# Patient Record
Sex: FEMALE | Race: WHITE | NOT HISPANIC OR LATINO
[De-identification: names, ages, dates, MRNs, and addresses within clinical notes are randomized per-mention and may not be internally consistent; named-entity substitution may affect disease eponyms.]

---

## 2017-03-30 ENCOUNTER — RECORDS - HEALTHEAST (OUTPATIENT)
Dept: ADMINISTRATIVE | Facility: OTHER | Age: 37
End: 2017-03-30

## 2017-03-30 LAB
HPV_EXT - HISTORICAL: NORMAL
PAP SMEAR - HIM PATIENT REPORTED: NORMAL

## 2017-05-10 ENCOUNTER — OFFICE VISIT - HEALTHEAST (OUTPATIENT)
Dept: FAMILY MEDICINE | Facility: CLINIC | Age: 37
End: 2017-05-10

## 2017-05-10 DIAGNOSIS — Z13.220 SCREENING FOR LIPID DISORDERS: ICD-10-CM

## 2017-05-10 DIAGNOSIS — Z13.1 SCREENING FOR DIABETES MELLITUS: ICD-10-CM

## 2017-05-10 DIAGNOSIS — Z00.00 ROUTINE GENERAL MEDICAL EXAMINATION AT A HEALTH CARE FACILITY: ICD-10-CM

## 2017-05-10 LAB
CHOLEST SERPL-MCNC: 184 MG/DL
FASTING STATUS PATIENT QL REPORTED: ABNORMAL
HDLC SERPL-MCNC: 42 MG/DL
LDLC SERPL CALC-MCNC: 128 MG/DL
TRIGL SERPL-MCNC: 70 MG/DL

## 2017-05-10 ASSESSMENT — MIFFLIN-ST. JEOR: SCORE: 1454.95

## 2017-05-11 ENCOUNTER — COMMUNICATION - HEALTHEAST (OUTPATIENT)
Dept: FAMILY MEDICINE | Facility: CLINIC | Age: 37
End: 2017-05-11

## 2017-05-11 ENCOUNTER — AMBULATORY - HEALTHEAST (OUTPATIENT)
Dept: FAMILY MEDICINE | Facility: CLINIC | Age: 37
End: 2017-05-11

## 2017-05-11 DIAGNOSIS — E16.2 LOW BLOOD GLUCOSE MEASUREMENT: ICD-10-CM

## 2017-05-18 ENCOUNTER — AMBULATORY - HEALTHEAST (OUTPATIENT)
Dept: LAB | Facility: CLINIC | Age: 37
End: 2017-05-18

## 2017-05-18 DIAGNOSIS — E16.2 LOW BLOOD GLUCOSE MEASUREMENT: ICD-10-CM

## 2017-05-24 ENCOUNTER — COMMUNICATION - HEALTHEAST (OUTPATIENT)
Dept: FAMILY MEDICINE | Facility: CLINIC | Age: 37
End: 2017-05-24

## 2017-11-01 ENCOUNTER — AMBULATORY - HEALTHEAST (OUTPATIENT)
Dept: NURSING | Facility: CLINIC | Age: 37
End: 2017-11-01

## 2017-11-01 DIAGNOSIS — Z23 NEED FOR IMMUNIZATION AGAINST INFLUENZA: ICD-10-CM

## 2018-04-21 ENCOUNTER — RECORDS - HEALTHEAST (OUTPATIENT)
Dept: ADMINISTRATIVE | Facility: OTHER | Age: 38
End: 2018-04-21

## 2018-04-25 ENCOUNTER — OFFICE VISIT - HEALTHEAST (OUTPATIENT)
Dept: PHARMACY | Facility: CLINIC | Age: 38
End: 2018-04-25

## 2018-04-25 ENCOUNTER — OFFICE VISIT - HEALTHEAST (OUTPATIENT)
Dept: FAMILY MEDICINE | Facility: CLINIC | Age: 38
End: 2018-04-25

## 2018-04-25 DIAGNOSIS — D68.51 FACTOR V LEIDEN (H): ICD-10-CM

## 2018-04-25 DIAGNOSIS — Z01.818 ENCOUNTER FOR PREOPERATIVE EXAMINATION FOR GENERAL SURGICAL PROCEDURE: ICD-10-CM

## 2018-04-25 DIAGNOSIS — Z51.81 ANTICOAGULATION MANAGEMENT ENCOUNTER: ICD-10-CM

## 2018-04-25 DIAGNOSIS — Z79.01 ANTICOAGULATION MANAGEMENT ENCOUNTER: ICD-10-CM

## 2018-04-25 LAB
ERYTHROCYTE [DISTWIDTH] IN BLOOD BY AUTOMATED COUNT: 11.1 % (ref 11–14.5)
HCT VFR BLD AUTO: 40.8 % (ref 35–47)
HGB BLD-MCNC: 13.9 G/DL (ref 12–16)
MCH RBC QN AUTO: 31.4 PG (ref 27–34)
MCHC RBC AUTO-ENTMCNC: 34 G/DL (ref 32–36)
MCV RBC AUTO: 92 FL (ref 80–100)
PLATELET # BLD AUTO: 238 THOU/UL (ref 140–440)
PMV BLD AUTO: 7.3 FL (ref 7–10)
RBC # BLD AUTO: 4.41 MILL/UL (ref 3.8–5.4)
WBC: 7.4 THOU/UL (ref 4–11)

## 2018-04-25 ASSESSMENT — MIFFLIN-ST. JEOR: SCORE: 1490.96

## 2018-05-07 ENCOUNTER — RECORDS - HEALTHEAST (OUTPATIENT)
Dept: ADMINISTRATIVE | Facility: OTHER | Age: 38
End: 2018-05-07

## 2018-05-14 ENCOUNTER — OFFICE VISIT - HEALTHEAST (OUTPATIENT)
Dept: FAMILY MEDICINE | Facility: CLINIC | Age: 38
End: 2018-05-14

## 2018-05-14 DIAGNOSIS — Z00.00 ENCOUNTER FOR ANNUAL PHYSICAL EXAM: ICD-10-CM

## 2018-05-14 DIAGNOSIS — Z83.49 FAMILY HISTORY OF HYPOTHYROIDISM: ICD-10-CM

## 2018-05-14 LAB
ALBUMIN SERPL-MCNC: 4 G/DL (ref 3.5–5)
ALP SERPL-CCNC: 63 U/L (ref 45–120)
ALT SERPL W P-5'-P-CCNC: 15 U/L (ref 0–45)
ANION GAP SERPL CALCULATED.3IONS-SCNC: 10 MMOL/L (ref 5–18)
AST SERPL W P-5'-P-CCNC: 16 U/L (ref 0–40)
BILIRUB SERPL-MCNC: 0.7 MG/DL (ref 0–1)
BUN SERPL-MCNC: 12 MG/DL (ref 8–22)
CALCIUM SERPL-MCNC: 9.9 MG/DL (ref 8.5–10.5)
CHLORIDE BLD-SCNC: 104 MMOL/L (ref 98–107)
CHOLEST SERPL-MCNC: 247 MG/DL
CO2 SERPL-SCNC: 26 MMOL/L (ref 22–31)
CREAT SERPL-MCNC: 0.65 MG/DL (ref 0.6–1.1)
FASTING STATUS PATIENT QL REPORTED: YES
FASTING STATUS PATIENT QL REPORTED: YES
GFR SERPL CREATININE-BSD FRML MDRD: >60 ML/MIN/1.73M2
GLUCOSE BLD-MCNC: 85 MG/DL (ref 70–99)
GLUCOSE BLD-MCNC: 86 MG/DL (ref 70–125)
HDLC SERPL-MCNC: 40 MG/DL
LDLC SERPL CALC-MCNC: 181 MG/DL
POTASSIUM BLD-SCNC: 4.3 MMOL/L (ref 3.5–5)
PROT SERPL-MCNC: 7.5 G/DL (ref 6–8)
SODIUM SERPL-SCNC: 140 MMOL/L (ref 136–145)
TRIGL SERPL-MCNC: 132 MG/DL
TSH SERPL DL<=0.005 MIU/L-ACNC: 1.96 UIU/ML (ref 0.3–5)

## 2018-05-14 ASSESSMENT — MIFFLIN-ST. JEOR: SCORE: 1472.82

## 2018-05-16 ENCOUNTER — COMMUNICATION - HEALTHEAST (OUTPATIENT)
Dept: FAMILY MEDICINE | Facility: CLINIC | Age: 38
End: 2018-05-16

## 2018-05-21 ENCOUNTER — RECORDS - HEALTHEAST (OUTPATIENT)
Dept: ADMINISTRATIVE | Facility: OTHER | Age: 38
End: 2018-05-21

## 2018-05-22 ENCOUNTER — COMMUNICATION - HEALTHEAST (OUTPATIENT)
Dept: FAMILY MEDICINE | Facility: CLINIC | Age: 38
End: 2018-05-22

## 2018-05-23 ENCOUNTER — COMMUNICATION - HEALTHEAST (OUTPATIENT)
Dept: FAMILY MEDICINE | Facility: CLINIC | Age: 38
End: 2018-05-23

## 2018-06-04 ENCOUNTER — RECORDS - HEALTHEAST (OUTPATIENT)
Dept: ADMINISTRATIVE | Facility: OTHER | Age: 38
End: 2018-06-04

## 2018-06-14 ENCOUNTER — RECORDS - HEALTHEAST (OUTPATIENT)
Dept: ADMINISTRATIVE | Facility: OTHER | Age: 38
End: 2018-06-14

## 2018-06-20 ENCOUNTER — COMMUNICATION - HEALTHEAST (OUTPATIENT)
Dept: FAMILY MEDICINE | Facility: CLINIC | Age: 38
End: 2018-06-20

## 2018-07-16 ENCOUNTER — RECORDS - HEALTHEAST (OUTPATIENT)
Dept: ADMINISTRATIVE | Facility: OTHER | Age: 38
End: 2018-07-16

## 2018-10-15 ENCOUNTER — RECORDS - HEALTHEAST (OUTPATIENT)
Dept: ADMINISTRATIVE | Facility: OTHER | Age: 38
End: 2018-10-15

## 2018-10-30 ENCOUNTER — AMBULATORY - HEALTHEAST (OUTPATIENT)
Dept: NURSING | Facility: CLINIC | Age: 38
End: 2018-10-30

## 2019-05-30 ENCOUNTER — OFFICE VISIT - HEALTHEAST (OUTPATIENT)
Dept: FAMILY MEDICINE | Facility: CLINIC | Age: 39
End: 2019-05-30

## 2019-05-30 DIAGNOSIS — Z00.00 ROUTINE GENERAL MEDICAL EXAMINATION AT A HEALTH CARE FACILITY: ICD-10-CM

## 2019-05-30 DIAGNOSIS — Z23 IMMUNIZATION DUE: ICD-10-CM

## 2019-05-30 DIAGNOSIS — Z13.220 SCREENING FOR LIPID DISORDERS: ICD-10-CM

## 2019-05-30 LAB
CHOLEST SERPL-MCNC: 205 MG/DL
FASTING STATUS PATIENT QL REPORTED: NO
HDLC SERPL-MCNC: 45 MG/DL
LDLC SERPL CALC-MCNC: 135 MG/DL
TRIGL SERPL-MCNC: 127 MG/DL

## 2019-05-30 ASSESSMENT — MIFFLIN-ST. JEOR: SCORE: 1461.48

## 2019-06-10 ENCOUNTER — RECORDS - HEALTHEAST (OUTPATIENT)
Dept: MAMMOGRAPHY | Facility: CLINIC | Age: 39
End: 2019-06-10

## 2019-06-10 DIAGNOSIS — Z12.31 ENCOUNTER FOR SCREENING MAMMOGRAM FOR MALIGNANT NEOPLASM OF BREAST: ICD-10-CM

## 2019-06-19 ENCOUNTER — HOSPITAL ENCOUNTER (OUTPATIENT)
Dept: MAMMOGRAPHY | Facility: CLINIC | Age: 39
Discharge: HOME | End: 2019-06-19
Attending: FAMILY MEDICINE

## 2019-06-19 DIAGNOSIS — N64.89 BREAST ASYMMETRY: ICD-10-CM

## 2019-08-05 ENCOUNTER — HOSPITAL ENCOUNTER (OUTPATIENT)
Dept: MAMMOGRAPHY | Facility: CLINIC | Age: 39
Discharge: HOME | End: 2019-08-05
Attending: FAMILY MEDICINE

## 2019-08-05 ENCOUNTER — HOSPITAL ENCOUNTER (OUTPATIENT)
Dept: MAMMOGRAPHY | Facility: CLINIC | Age: 39
Discharge: HOME | End: 2019-08-05
Attending: FAMILY MEDICINE | Admitting: RADIOLOGY

## 2019-08-05 DIAGNOSIS — R92.0 BREAST MICROCALCIFICATIONS: ICD-10-CM

## 2019-08-06 ENCOUNTER — COMMUNICATION - HEALTHEAST (OUTPATIENT)
Dept: MAMMOGRAPHY | Facility: CLINIC | Age: 39
End: 2019-08-06

## 2019-08-06 LAB
LAB AP CHARGES (HE HISTORICAL CONVERSION): NORMAL
PATH REPORT.COMMENTS IMP SPEC: NORMAL
PATH REPORT.COMMENTS IMP SPEC: NORMAL
PATH REPORT.FINAL DX SPEC: NORMAL
PATH REPORT.GROSS SPEC: NORMAL
PATH REPORT.MICROSCOPIC SPEC OTHER STN: NORMAL
PATH REPORT.RELEVANT HX SPEC: NORMAL
RESULT FLAG (HE HISTORICAL CONVERSION): NORMAL

## 2019-08-16 ENCOUNTER — RECORDS - HEALTHEAST (OUTPATIENT)
Dept: HEALTH INFORMATION MANAGEMENT | Facility: CLINIC | Age: 39
End: 2019-08-16

## 2019-11-05 ENCOUNTER — AMBULATORY - HEALTHEAST (OUTPATIENT)
Dept: NURSING | Facility: CLINIC | Age: 39
End: 2019-11-05

## 2019-11-05 DIAGNOSIS — Z23 IMMUNIZATION DUE: ICD-10-CM

## 2020-07-17 ENCOUNTER — COMMUNICATION - HEALTHEAST (OUTPATIENT)
Dept: SCHEDULING | Facility: CLINIC | Age: 40
End: 2020-07-17

## 2020-07-17 ENCOUNTER — RECORDS - HEALTHEAST (OUTPATIENT)
Dept: ADMINISTRATIVE | Facility: OTHER | Age: 40
End: 2020-07-17

## 2020-07-17 ENCOUNTER — OFFICE VISIT - HEALTHEAST (OUTPATIENT)
Dept: FAMILY MEDICINE | Facility: CLINIC | Age: 40
End: 2020-07-17

## 2020-07-17 DIAGNOSIS — N61.0 MASTITIS: ICD-10-CM

## 2020-07-28 ENCOUNTER — OFFICE VISIT - HEALTHEAST (OUTPATIENT)
Dept: FAMILY MEDICINE | Facility: CLINIC | Age: 40
End: 2020-07-28

## 2020-07-28 DIAGNOSIS — Z00.00 ROUTINE GENERAL MEDICAL EXAMINATION AT A HEALTH CARE FACILITY: ICD-10-CM

## 2020-07-28 DIAGNOSIS — Z13.220 SCREENING FOR LIPID DISORDERS: ICD-10-CM

## 2020-07-28 DIAGNOSIS — Z13.1 SCREENING FOR DIABETES MELLITUS: ICD-10-CM

## 2020-07-28 LAB
CHOLEST SERPL-MCNC: 182 MG/DL
FASTING STATUS PATIENT QL REPORTED: YES
FASTING STATUS PATIENT QL REPORTED: YES
GLUCOSE BLD-MCNC: 75 MG/DL (ref 70–99)
HDLC SERPL-MCNC: 38 MG/DL
LDLC SERPL CALC-MCNC: 115 MG/DL
TRIGL SERPL-MCNC: 143 MG/DL

## 2020-07-28 ASSESSMENT — MIFFLIN-ST. JEOR: SCORE: 1479.75

## 2020-07-30 ENCOUNTER — RECORDS - HEALTHEAST (OUTPATIENT)
Dept: ADMINISTRATIVE | Facility: OTHER | Age: 40
End: 2020-07-30

## 2020-07-30 ENCOUNTER — RECORDS - HEALTHEAST (OUTPATIENT)
Dept: RADIOLOGY | Facility: CLINIC | Age: 40
End: 2020-07-30

## 2020-07-30 ENCOUNTER — HOSPITAL ENCOUNTER (OUTPATIENT)
Dept: MAMMOGRAPHY | Facility: CLINIC | Age: 40
Discharge: HOME | End: 2020-07-30

## 2020-07-30 DIAGNOSIS — N61.0 MASTITIS: ICD-10-CM

## 2020-07-31 ENCOUNTER — HOSPITAL ENCOUNTER (OUTPATIENT)
Dept: MAMMOGRAPHY | Facility: CLINIC | Age: 40
Discharge: HOME | End: 2020-07-31

## 2020-07-31 ENCOUNTER — COMMUNICATION - HEALTHEAST (OUTPATIENT)
Dept: FAMILY MEDICINE | Facility: CLINIC | Age: 40
End: 2020-07-31

## 2020-07-31 DIAGNOSIS — N61.0 MASTITIS: ICD-10-CM

## 2020-09-15 ENCOUNTER — COMMUNICATION - HEALTHEAST (OUTPATIENT)
Dept: FAMILY MEDICINE | Facility: CLINIC | Age: 40
End: 2020-09-15

## 2020-09-15 ENCOUNTER — COMMUNICATION - HEALTHEAST (OUTPATIENT)
Dept: SCHEDULING | Facility: CLINIC | Age: 40
End: 2020-09-15

## 2020-10-07 ENCOUNTER — AMBULATORY - HEALTHEAST (OUTPATIENT)
Dept: NURSING | Facility: CLINIC | Age: 40
End: 2020-10-07

## 2020-10-07 DIAGNOSIS — Z23 IMMUNIZATION DUE: ICD-10-CM

## 2021-05-27 VITALS
OXYGEN SATURATION: 99 % | TEMPERATURE: 98 F | HEART RATE: 98 BPM | SYSTOLIC BLOOD PRESSURE: 123 MMHG | DIASTOLIC BLOOD PRESSURE: 76 MMHG

## 2021-05-28 ENCOUNTER — OFFICE VISIT - HEALTHEAST (OUTPATIENT)
Dept: FAMILY MEDICINE | Facility: CLINIC | Age: 41
End: 2021-05-28

## 2021-05-28 DIAGNOSIS — Z00.00 ROUTINE GENERAL MEDICAL EXAMINATION AT A HEALTH CARE FACILITY: ICD-10-CM

## 2021-05-28 LAB
ANION GAP SERPL CALCULATED.3IONS-SCNC: 12 MMOL/L (ref 5–18)
BUN SERPL-MCNC: 13 MG/DL (ref 8–22)
CALCIUM SERPL-MCNC: 9.2 MG/DL (ref 8.5–10.5)
CHLORIDE BLD-SCNC: 105 MMOL/L (ref 98–107)
CHOLEST SERPL-MCNC: 194 MG/DL
CO2 SERPL-SCNC: 22 MMOL/L (ref 22–31)
CREAT SERPL-MCNC: 0.7 MG/DL (ref 0.6–1.1)
FASTING STATUS PATIENT QL REPORTED: YES
GFR SERPL CREATININE-BSD FRML MDRD: >60 ML/MIN/1.73M2
GLUCOSE BLD-MCNC: 79 MG/DL (ref 70–125)
HDLC SERPL-MCNC: 44 MG/DL
LDLC SERPL CALC-MCNC: 133 MG/DL
POTASSIUM BLD-SCNC: 4.1 MMOL/L (ref 3.5–5)
SODIUM SERPL-SCNC: 139 MMOL/L (ref 136–145)
TRIGL SERPL-MCNC: 84 MG/DL

## 2021-05-28 ASSESSMENT — MIFFLIN-ST. JEOR: SCORE: 1501.8

## 2021-05-29 NOTE — PROGRESS NOTES
Assessment:Plan       1. Routine general medical examination at a health care facility  ( Needs correct coding to get credit for annual exam through her insurance).  Chart sent for  review.    2. Screening for lipid disorders  - Lipid Profile    Subjective:      Ciera Pelayo is a 39 y.o. female who presents for an annual exam. The patient is sexually active. The patient participates in regular exercise: no. The patient reports that there is not domestic violence in her life.   See Gyn for PAP. Had IUD placed ~ 6 years ago. No menstrual periods on IUD.  No health concerns.   Pap in 2017 per pt.     Family history- + hyperlipidemia. Breast cancer + but 2nd degree relatives.     Healthy Habits:   Regular Exercise: No  Sunscreen Use: Yes  Healthy Diet: Yes  Dental Visits Regularly: Yes  Seat Belt: Yes  Sexually active: Yes  Self Breast Exam Monthly:No  Hemoccults: No  Flex Sig: No  Colonoscopy: No  Lipid Profile: Yes  Glucose Screen: Yes  Prevention of Osteoporosis: No  Last Dexa: No  Guns at Home:  Yes  Guns Safety Locks:  Yes      Immunization History   Administered Date(s) Administered     Influenza, inj, historic,unspecified 10/15/2008, 10/06/2009, 10/05/2010, 10/20/2011     Influenza, seasonal,quad inj 36+ mos 10/07/2015, 10/28/2016, 10/30/2018     Influenza, seasonal,quad inj 6-35 mos 10/06/2009, 10/16/2012     Influenza,seasonal quad, PF 11/07/2014     Influenza,seasonal quad, PF, 36+MOS 11/01/2017     Td,adult,historic,unspecified 07/30/2007     Tdap 07/30/2007     Immunization status: due today Tdap .     No exam data present    Gynecologic History  No LMP recorded. (Menstrual status: Contraception).  Contraception: IUD  Last Pap: unknown ANNE sign today to [facility]. Results were: N/A   Last mammogram: None, will schedule.       OB History   No data available       Current Outpatient Medications   Medication Sig Dispense Refill     acetaminophen (TYLENOL) 500 MG tablet Take 500 mg by mouth every 6 (six)  hours as needed for pain.       ELIQUIS 2.5 mg Tab tablet TAKE 1 TABLET BY MOUTH TWO TIMES DAILY 60 tablet 1     No current facility-administered medications for this visit.      No past medical history on file.  No past surgical history on file.  Patient has no known allergies.  No family history on file.  Social History     Socioeconomic History     Marital status:      Spouse name: Not on file     Number of children: Not on file     Years of education: Not on file     Highest education level: Not on file   Occupational History     Not on file   Social Needs     Financial resource strain: Not on file     Food insecurity:     Worry: Not on file     Inability: Not on file     Transportation needs:     Medical: Not on file     Non-medical: Not on file   Tobacco Use     Smoking status: Never Smoker     Smokeless tobacco: Never Used   Substance and Sexual Activity     Alcohol use: No     Drug use: No     Sexual activity: Yes     Partners: Male     Birth control/protection: IUD     Comment:    Lifestyle     Physical activity:     Days per week: Not on file     Minutes per session: Not on file     Stress: Not on file   Relationships     Social connections:     Talks on phone: Not on file     Gets together: Not on file     Attends L[employer] service: Not on file     Active member of club or organization: Not on file     Attends meetings of clubs or organizations: Not on file     Relationship status: Not on file     Intimate partner violence:     Fear of current or ex partner: Not on file     Emotionally abused: Not on file     Physically abused: Not on file     Forced sexual activity: Not on file   Other Topics Concern     Not on file   Social History Narrative     Not on file       Review of Systems  Review of Systems      No abnormal vaginal bleeding.   No acute fever or URI symptoms.       Objective:         Vitals:    05/30/19 1425   BP: 100/62   Pulse: 80   Resp: 16   Temp: 98.2  F (36.8  C)   TempSrc:  "Oral   Weight: 162 lb 8 oz (73.7 kg)   Height: 5' 9\" (1.753 m)     Body mass index is 24 kg/m .    Physical  Physical Exam     Gen - alert, orientated, NAD  Eyes - fundascopic exam limited by the undialated pupil but looks symmetric  ENT - oropharynx clear, TMs clear  Neck - supple, no palpable mass or lymphadenopathy  CV - RRR, no murmur  Breast - no dominant mass on either side, no axillary mass.  Resp - lungs CTA  Ab - soft, nontender, no palpable mass or organomegaly   - Deferred, no concerns.   Extrem - warm, no edema  Neuro - CN II-XII intact, strength, sensation, reflexes intact and symmetric  Skin - no rash.  No atypical appearing lesions seen.     "

## 2021-05-30 ENCOUNTER — RECORDS - HEALTHEAST (OUTPATIENT)
Dept: ADMINISTRATIVE | Facility: CLINIC | Age: 41
End: 2021-05-30

## 2021-05-30 VITALS — WEIGHT: 161.06 LBS | BODY MASS INDEX: 23.86 KG/M2 | HEIGHT: 69 IN

## 2021-05-31 ENCOUNTER — RECORDS - HEALTHEAST (OUTPATIENT)
Dept: ADMINISTRATIVE | Facility: CLINIC | Age: 41
End: 2021-05-31

## 2021-06-01 VITALS — HEIGHT: 69 IN | BODY MASS INDEX: 24.44 KG/M2 | WEIGHT: 165 LBS

## 2021-06-01 VITALS — WEIGHT: 169 LBS | HEIGHT: 69 IN | BODY MASS INDEX: 25.03 KG/M2

## 2021-06-03 VITALS — HEIGHT: 69 IN | BODY MASS INDEX: 24.07 KG/M2 | WEIGHT: 162.5 LBS

## 2021-06-04 VITALS
RESPIRATION RATE: 20 BRPM | DIASTOLIC BLOOD PRESSURE: 66 MMHG | HEIGHT: 69 IN | BODY MASS INDEX: 24.59 KG/M2 | HEART RATE: 72 BPM | TEMPERATURE: 97.8 F | WEIGHT: 166.06 LBS | SYSTOLIC BLOOD PRESSURE: 102 MMHG | OXYGEN SATURATION: 97 %

## 2021-06-09 NOTE — TELEPHONE ENCOUNTER
RN triage call from piedad  She noted Right breast pain onset 7/15/20  (not breast feeding)  She states she also felt in the area a hardened area  The next day it seemed lessened  Today feels better but now the area is red (about 4 fingers in size)  Also the area feels warm  Denies fever or chills but did feel body aches yesterday  Leaving for out of town tomorrow  Gave disposition for office visit today  Patient was agreeable. No in office visits left today patient will got to [facility] now  Tereza Hall, RN  [facility] Triage Nurse  12:33 PM  7/17/2020              Reason for Disposition    Breast looks infected (spreading redness, feels hot or painful to touch) and no fever    Additional Information    Negative: SEVERE breast pain and fever > 103 F (39.4 C)    Negative: Patient sounds very sick or weak to the triager    Protocols used: BREAST SYMPTOMS-A-[place]

## 2021-06-09 NOTE — PROGRESS NOTES
Chief Complaint   Patient presents with     Breast Pain     rt x 2 days, redness       HPI:  Ciera Pelayo is a 40 y.o. female who presents today with 2 days of erythema of right breast.  She states there is some mild tenderness and possibly a lump.  Her symptoms have worsened on the second day.  She denies any fevers, chills or fatigue.  She denies any nipple drainage.  She has nonlactating.  She denies any rash, malaise or myalgias.    Problem List:  Cellulitis  Factor V Leiden (H)  Pregnancy      No past medical history on file.    No current outpatient medications on file prior to visit.     No current facility-administered medications on file prior to visit.         Social History     Tobacco Use     Smoking status: Never Smoker     Smokeless tobacco: Never Used   Substance Use Topics     Alcohol use: No       ROS:  As stated in HPI    Vitals:    07/17/20 1332   BP: 123/76   Patient Site: Right Arm   Patient Position: Sitting   Cuff Size: Adult Regular   Pulse: 98   Temp: 98  F (36.7  C)   TempSrc: Oral   SpO2: 99%       Physical Exam:  General: Alert, No apparent distress, Cooperative  SKIN: dry, warm, no rash or lesions seen in areas of exposed skin  HENT: HEAD: ATNC,   EYES: Conjunctiva clear, EOMI  NECK: Supple, non tender, no cervical adenopathy  LUNGS: No respiratory distress,   Breast: Erythema over the inferolateral aspect of the right breast extending to the areaola.  Mild tenderness, no fluctuance but area is hardened.  NUERO: AOx3, normal mentation  PSYCH: normal mood and affect    Ultrasound breast: No abscess noted    Assessment and Plan:  1. Mastitis  US Breast Right Limited 1-3 Quadrants    US Breast Right Limited 1-3 Quadrants    CANCELED: US Breast Right Limited 1-3 Quadrants    CANCELED: US Biopsy Lymph Node Core (Breast) Right    CANCELED: US Breast Right Limited 1-3 Quadrants      Breast exam was done with a chaperone in the room    Ultrasound showed no abscess.  Exam is concerning for  mastitis and will treat empirically with Augmentin for infectious cause.   inflammatory/obstructive and infectious mastitis can improve over the course of a week or 2 and it is prudent to have a diagnostic bilateral mammogram in 2 to 3 weeks to make sure patient has no mass/malignancy or other cause of obstruction or inflammation.  Patient can start Augmentin today.  Discussed proper ministration side effects.  Ibuprofen for pain        Azeem Hurley PA-C

## 2021-06-10 NOTE — TELEPHONE ENCOUNTER
Ordering provider not working today in [facility] (Ranjeet Hurley) and the order went to his personal in-box. I spoke with the [facility] this afternoon and since primary MD has seen the patient 3 days ago and followed up the mastitis, the [facility] will contact her regarding getting ordered signed.

## 2021-06-10 NOTE — TELEPHONE ENCOUNTER
Who is calling:  Michelle  Reason for Call:  Order for right breast ultrasound needs to be signed.  Patient is due at 8:30 this morning.  Date of last appointment with primary care: Yesterday at [facility].   Okay to leave a detailed message: Yes

## 2021-06-10 NOTE — PROGRESS NOTES
Assessment:Plan   1. Routine general medical examination at a health care facility  Normal pap in 4/17.per pt. Done at Gyn office.    2. Screening for lipid disorders  - Lipid Cascade    3. Screening for diabetes mellitus    - Glucose      Subjective:      Ciera Pelayo is a 37 y.o. female who presents for an annual exam. The patient is sexually active. The patient participates in regular exercise: no. The patient reports that there is not domestic violence in her life.   No health concerns.    Healthy Habits:   Regular Exercise: No  Sunscreen Use: Yes  Healthy Diet: sometimes  Dental Visits Regularly: Yes  Seat Belt: Yes  Sexually active: Yes  Self Breast Exam Monthly:No  Hemoccults: N/A  Flex Sig: N/A  Colonoscopy: N/A  Lipid Profile: No  Glucose Screen: No  Prevention of Osteoporosis: N/A  Last Dexa: N/A        Immunization History   Administered Date(s) Administered     Influenza, inj, historic 10/15/2008, 10/06/2009, 10/05/2010, 10/20/2011     Influenza, seasonal,quad inj 36+ mos 10/07/2015, 10/28/2016     Influenza, seasonal,quad inj 6-35 mos 10/06/2009, 10/16/2012     Influenza,seasonal quad, PF 11/07/2014     Td, historic 07/30/2007     Tdap 07/30/2007     Immunization status: up to date and documented.    No exam data present    Gynecologic History  No LMP recorded (lmp unknown). Patient is not currently having periods (Reason: Contraception).  Contraception: IUD  Last Pap: Apirl 2017 done at [facility]. Results were: normal  Last mammogram: n/a. Results were: n/a      OB History   No data available       No current outpatient prescriptions on file.     No current facility-administered medications for this visit.      No past medical history on file.  No past surgical history on file.  Review of patient's allergies indicates no known allergies.  No family history on file.  Social History     Social History     Marital status:      Spouse name: N/A     Number of children: N/A     Years  "of education: N/A     Occupational History     Not on file.     Social History Main Topics     Smoking status: Never Smoker     Smokeless tobacco: Never Used     Alcohol use Yes     Drug use: Yes     Sexual activity: Yes     Partners: Male     Birth control/ protection: IUD      Comment:      Other Topics Concern     Not on file     Social History Narrative     No narrative on file       Review of Systems  General:  Denies problem  Eyes: Denies problem  Ears/Nose/Throat: Denies problem  Cardiovascular: Denies problem  Respiratory:  Denies problem  Gastrointestinal:  Denies problem, Genitourinary: Denies problem  Musculoskeletal:  Denies problem  Skin: Denies problem  Neurologic: Denies problem  Psychiatric: Denies problem  Endocrine: Denies problem  Heme/Lymphatic: Denies problem   Allergic/Immunologic: Denies problem        Objective:         Vitals:    05/10/17 1307   BP: 116/78   Pulse: 76   Resp: 16   Temp: 97.8  F (36.6  C)   TempSrc: Oral   Weight: 161 lb 1 oz (73.1 kg)   Height: 5' 9\" (1.753 m)     Body mass index is 23.78 kg/(m^2).    Physical Exam:  Gen - alert, orientated, NAD  Eyes - fundascopic exam limited by the undialated pupil but looks symmetric  ENT - oropharynx clear, TMs clear  Neck - supple, no palpable mass or lymphadenopathy  CV - RRR, no murmur  Breast - deferred, no concerns.  Resp - lungs CTA  Ab - soft, nontender, no palpable mass or organomegaly   - deferred.  Extrem - warm, no edema  Neuro - CN II-XII intact, strength, sensation, reflexes intact and symmetric  Skin - no rash.  No atypical appearing lesions seen.          "

## 2021-06-10 NOTE — TELEPHONE ENCOUNTER
Unable to sign order. No pended order in the chart. Can you call radiology to see if they still need the order?. Pt had US done this morning already.    Dr. Richard Uribe  7/31/2020 1:09 PM

## 2021-06-10 NOTE — PROGRESS NOTES
"FEMALE PREVENTATIVE EXAM    Assessment and Plan:     1. Routine general medical examination at a health care facility  Normal pap in 3/2017. Sees Gyn but thinking about \" getting everything done here\".  No h/o abnormal pap.      2. Screening for lipid disorders  - Lipid Independence FASTING    3. Screening for diabetes mellitus  - Glucose    Immunization Review  Adult Imm Review: No immunizations due today            Subjective:   Chief Complaint: Ciera Pelayo is an 40 y.o. female here for a preventative health visit.     HPI: The patient is here for annual physical.  She is not on any daily medications.  No known chronic medical conditions.  Currently on furlough.  Works at school cafeteria.    Had IUD placed by GYN 3 years ago.  Menstrual periods are regular.  No abnormal vaginal bleeding.  No abnormal vaginal discharge.  No known exposure to COVID-19.    Was seen at [facility] clinic on 7/17/2020 due to mastitis.  She completed antibiotic yesterday.  Diagnostic mammogram scheduled in 3 days.  The pain and swelling has improved.  No fever with this.    Healthy Habits  Are you taking a daily aspirin? No  Do you typically exercising at least 40 min, 3-4 times per week?  NO  Do you usually eat at least 4 servings of fruit and vegetables a day, include whole grains and fiber and avoid regularly eating high fat foods? Yes  Have you had an eye exam in the past two years? Yes  Do you see a dentist twice per year? Yes  Do you have any concerns regarding sleep? No    Safety Screen  If you own firearms, are they secured in a locked gun cabinet or with trigger locks? Yes  Do you feel you are safe where you are living?: Yes (7/28/2020  9:08 AM)  Do you feel you are safe in your relationship(s)?: Yes (7/28/2020  9:08 AM)      Review of Systems: No acute fever or URI symptoms.  No sore throat, cough, shortness of breath or chest pain.  No abdominal pain or diarrhea.      Cancer Screening       Status Date      PAP SMEAR Overdue " "4/26/2020      Done 4/26/2017      Patient has more history with this topic...          History     Reviewed By Date/Time Sections Reviewed    Sabine Horta, [facility] 7/28/2020  9:10 AM Tobacco            Objective:     Visit Vitals  /66 (Patient Site: Left Arm, Patient Position: Sitting, Cuff Size: Adult Regular)   Pulse 72   Temp 97.8  F (36.6  C) (Oral)   Resp 20   Ht 5' 9.13\" (1.756 m)   Wt 166 lb 1 oz (75.3 kg)   LMP  (LMP Unknown)   SpO2 97%   BMI 24.43 kg/m           PHYSICAL EXAM  Gen - alert, orientated, NAD  Eyes - fundascopic exam limited by the undialated pupil but looks symmetric  ENT - oropharynx clear, TMs clear  Neck - supple, no palpable mass or lymphadenopathy  CV - RRR, no murmur  Breast - Normal.  Resp - lungs CTA  Ab - soft, nontender, no palpable mass or organomegaly   - deferred.   Extrem - warm, no edema  Neuro - CN II-XII intact, strength, sensation, reflexes intact and symmetric  Skin - no rash.  No atypical appearing lesions seen.       Additional Screenings Completed Today:     "

## 2021-06-11 NOTE — TELEPHONE ENCOUNTER
Clinic Action Needed: Patient is requesting a letter  FNA Triage Call  Presenting Problem:  Patient reports her daughter went to [facility] and got a Covid test done today. Patient needs proof that she is out from work due to Covid testing.     Can you write a letter for patient?      Routed to: Dr Uribe, and Dr Uribe care team Jarod Kwong RN/[facility]        Reason for Disposition    [1] Caller requesting NON-URGENT health information AND [2] PCP's office is the best resource    Additional Information    Negative: [1] Caller is not with the adult (patient) AND [2] reporting urgent symptoms    Negative: Lab result questions    Negative: Medication questions    Negative: Caller can't be reached by phone    Negative: Caller has already spoken to PCP or another triager    Negative: RN needs further essential information from caller in order to complete triage    Negative: Requesting regular office appointment    Protocols used: INFORMATION ONLY CALL-A-

## 2021-06-11 NOTE — TELEPHONE ENCOUNTER
The patient is getting covid test too? Note states daughter got tested.     Dr. Richard Uribe  9/15/2020 4:21 PM

## 2021-06-11 NOTE — TELEPHONE ENCOUNTER
Called the patient. Note provided. Pt will print out from my chart.    Dr. Richard Uribe  9/15/2020 4:50 PM

## 2021-06-17 NOTE — PROGRESS NOTES
Assessment/Plan:      Visit for Preoperative Exam.     Patient approved for surgery with general or local anesthesia.     Factor V Leiden deficiency: Will need prophylaxis post-op with Eliquis for 2 weeks. Prescription has been sent.     Subjective:     Scheduled Procedure: Left  Ankle surgery.  Surgery Date:  04/27/18  Surgery Location:  [facility]  Surgeon:  Dr. Rice.    Broke ankle on Saturday, 4/21 while roller skating with son.     Current Outpatient Prescriptions   Medication Sig Dispense Refill     oxyCODONE-acetaminophen (PERCOCET) 5-325 mg per tablet Take 5-325 tablets by mouth 2 (two) times a day.  0     No current facility-administered medications for this visit.        No Known Allergies    Immunization History   Administered Date(s) Administered     Influenza, inj, historic,unspecified 10/15/2008, 10/06/2009, 10/05/2010, 10/20/2011     Influenza, seasonal,quad inj 36+ mos 10/07/2015, 10/28/2016     Influenza, seasonal,quad inj 6-35 mos 10/06/2009, 10/16/2012     Influenza,seasonal quad, PF 11/07/2014     Influenza,seasonal quad, PF, 36+MOS 11/01/2017     Td,adult,historic,unspecified 07/30/2007     Tdap 07/30/2007       Patient Active Problem List   Diagnosis     Cellulitis     Factor V Deficiency     Pregnancy     Medical History: DVT during pregnancy in 2007.     Social History     Social History     Marital status:      Spouse name: N/A     Number of children: N/A     Years of education: N/A     Occupational History     Not on file.     Social History Main Topics     Smoking status: Never Smoker     Smokeless tobacco: Never Used     Alcohol use No     Drug use: No     Sexual activity: Yes     Partners: Male     Birth control/ protection: IUD      Comment:      Other Topics Concern     Not on file     Social History Narrative       Surgical History: broken arm at age 9    History of Present Illness  Recent Health  Fever: no  Chills: no  Fatigue: no  Chest Pain: no  Cough:  "no  Dyspnea: no  Urinary Frequency: no  Nausea: no  Vomiting: no  Diarrhea: no  Abdominal Pain: no  Easy Bruising: no  Lower Extremity Swelling: no  Poor Exercise Tolerance: no    Most recent Health Maintenance Visit:  1 year(s) ago    Pertinent History  Prior Anesthesia: yes  Previous Anesthesia Reaction:  no  Diabetes: no  Cardiovascular Disease: no  Pulmonary Disease: no  Renal Disease: no  GI Disease: no  Sleep Apnea: no  Thromboembolic Problems: no  Clotting Disorder: Yes- Factor V Leiden. Had DVT while pregnant in 2007. Took Lovenox, then heparin with two subsequent pregnancies.   Bleeding Disorder: No  Transfusion Reaction: no  Impaired Immunity: no  Steroid use in the last 6 months: no  Frequent Aspirin use: no    Family history of None    Social history of None    After surgery, the patient plans to recover at home with family.    Review of Systems  Review of Systems   Constitutional: Negative.    HENT: Negative.    Respiratory: Negative.    Cardiovascular: Negative.    Gastrointestinal: Negative.    Genitourinary: Negative.    Musculoskeletal: Positive for joint swelling.   Neurological: Negative.              Objective:         Vitals:    04/25/18 1313   BP: 112/76   Pulse: 94   Resp: 17   Temp: 97.8  F (36.6  C)   TempSrc: Oral   SpO2: 97%   Weight: 169 lb (76.7 kg)   Height: 5' 9\" (1.753 m)     Labs: CBC normal  Pregnancy test negative.     Physical Exam:  Physical Exam   Constitutional: She is oriented to person, place, and time. She appears well-developed and well-nourished. No distress.   HENT:   Head: Normocephalic and atraumatic.   Mouth/Throat: Oropharynx is clear and moist.   Eyes: Conjunctivae and EOM are normal.   Neck: Normal range of motion. Neck supple. No thyromegaly present.   Cardiovascular: Normal rate and regular rhythm.    No murmur heard.  Pulmonary/Chest: Effort normal and breath sounds normal. No respiratory distress. She has no wheezes. She has no rales.   Abdominal: She exhibits " no distension. There is no tenderness.   Musculoskeletal: Normal range of motion.   Left ankle in ACE wrap   Neurological: She is alert and oriented to person, place, and time.   Skin: Skin is warm and dry.   Psychiatric: She has a normal mood and affect.         Spent 40 min face to face with patient with more the 50% spent in counseling, reviewing chart, and coordination of care and discussing problems listed above.     Jolly Ridley MD

## 2021-06-17 NOTE — PROGRESS NOTES
[facility] Clinical Consult     Ciera is undergoing ankle surgery and has FVL with a hx of VTE during pregancy x1.  Dr. Ridley would like to anticoagulate for two weeks with a DOAC.  I was able to verify coverage of Eliquis and called in a verbal order to [facility] on [address].  I also instructed Angelique on printing the copay card and what to watch for when starting Eliquis.  She will take 2.5mg BID x4 weeks; longer than standard duration due to multiple risk factors.      Robson Levy, PharmD, BCACP  [facility] Pharmacist at [facility]

## 2021-06-18 NOTE — PATIENT INSTRUCTIONS - HE
Patient Instructions by Azeem Hurley PA-C at 7/17/2020  1:10 PM     Author: Azeem Hurley PA-C Service: -- Author Type: Physician Assistant    Filed: 7/17/2020  2:11 PM Encounter Date: 7/17/2020 Status: Addendum    : Azeem Hurley PA-C (Physician Assistant)    Related Notes: Original Note by Azeem Hurley PA-C (Physician Assistant) filed at 7/17/2020  2:11 PM       Start antibiotics today.    Ibuprofen as needed for pain    Will call with results to discuss if any further plans will be needed    Patient Education     Mastitis  Mastitis occurs when breast tissue becomes swollen and inflamed. This is almost always due to infection. Mastitis most often affects breastfeeding women during the first 6 weeks after childbirth. For this reason, its also known as lactation mastitis. Infection may happen after a duct becomes clogged, causing milk to back up in the breast. Mastitis may also occur if bacteria enter the breast through small cracks in the nipple. (Less often, mastitis occurs in women who arent breastfeeding. If you have mastitis that is not due to breastfeeding, your healthcare provider will give you more information as needed. Treatment may include some of the same home care measures listed below.)  Mastitis may cause flu-like symptoms such as fever, aches, and fatigue. The affected breast may feel painful, warm, tender, firm, or swollen. The skin over the breast may be red (often in a wedge-shaped pattern). You may feel a burning a sensation when breastfeeding.  In most cases, mastitis can be treated with antibiotics. This should clear the infection. If treatment is delayed, a pocket of pus (abscess) can form in the breast tissue. A procedure may then be needed to drain the pus. In severe cases of infection, other treatments may be needed.  Home care  Breastfeeding    Its very important to keep the milk flowing from the infected breast. Continue breastfeeding from both  breasts as usual. This will not hurt the baby. If this is too painful, use a breast pump to remove milk from the infected side. This can be fed to your baby or discarded. Note: If you don't continue to breastfeed or pump your breast, bacteria can grow in the milk that is left in your breast. This can make your infection worse.    Tell your healthcare provider if you have problems with breastfeeding. He or she may suggest changes to your technique, if needed. You may also be referred to a lactation nurse or consultant for support with breastfeeding.  General care    Take any medicines youre prescribed as directed. If youre taking antibiotics, be sure to complete all of the medicine even if you start to feel better. Over-the-counter pain medicines may also be recommended. Dont use breast creams or other products or medicines without talking to your healthcare provider first. Note: If youre concerned about taking medicines while breastfeeding, talk to your healthcare provider.    Rest as often as needed. Also be sure to drink plenty of fluids.    To help relieve pain and swelling, heat or ice may be used. Apply as often as directed by your provider.  ? Heat: Place a warm compress on the breast. Use a towel soaked in hot water, a heating pad, or a hot water bottle.  ? Cold: Place a cold compress on the breast. Use an ice pack or bag of ice wrapped in a thin towel. Never place a cold source directly on the skin.  Follow-up care  Follow up with your healthcare provider as advised.  When to seek medical advice  Call your healthcare provider right away if any of these occur:    Fever of 100.4 F (38 C) or higher, or as directed by your provider    Shaking chills    Symptoms worsen or do not improve within 48 to 72 hours of starting treatment    New symptoms develop  Date Last Reviewed: 7/30/2015 2000-2017 [employer] - [place]. [address] [place] [address], [place] [address]. All rights reserved. This information is not  intended as a substitute for professional medical care. Always follow your healthcare professional's instructions.

## 2021-06-18 NOTE — PROGRESS NOTES
FEMALE PREVENTATIVE EXAM    Assessment and Plan:       Ciera was seen today for annual exam.    Encounter for annual physical exam:   -     Lipid Profile  -     Glucose  -     Comprehensive Metabolic Panel    Family history of hypothyroidism: mom has hypothyroidism and Angelique has noticed her hair thinning. No other concerning symptoms such as constipation, heavy periods, fatigue, weight gain. Will check TSH. We did discuss that the thinning could be a side effect of her progesterone IUD.   -     Thyroid Stimulating Hormone (TSH)        Next follow up:  No Follow-up on file.    Immunization Review  Adult Imm Review: No immunizations due today  Does not smoke. BMI is in normal range.     I discussed the following with the patient:   Adult Healthy Living: Importance of regular exercise  Healthy nutrition  Getting adequate sleep  Stress management      Subjective:   Chief Complaint: Ciera Pelayo is an 38 y.o. female here for a preventative health visit.     HPI:  She has been feeling well. Had surgery to fix 3 broken bones in left ankle on 4/27. Doing 30 days of Eliquis given her Factor V Leiden.     She has felt like her hair was thinning for the past several months. Mom has hypothyroidism and she would like her TSH checked.     Healthy Habits  Are you taking a daily aspirin? No  Do you typically exercising at least 40 min, 3-4 times per week?  NO  Do you usually eat at least 4 servings of fruit and vegetables a day, include whole grains and fiber and avoid regularly eating high fat foods? NOt sure  Have you had an eye exam in the past two years? Yes  Do you see a dentist twice per year? Yes  Do you have any concerns regarding sleep? No    Safety Screen  If you own firearms, are they secured in a locked gun cabinet or with trigger locks? Yes  Do you feel you are safe where you are living?: Yes (5/14/2018 11:25 AM)  Do you feel you are safe in your relationship(s)?: Yes (5/14/2018 11:25 AM)    Review of Systems:   "Please see above.  The rest of the review of systems are negative for all systems.     Pap History:   Yes - updated in Problem List and Health Maintenance accordingly  Cancer Screening       Status Date      PAP SMEAR Next Due 4/26/2020      Done 4/26/2017           Patient Care Team:  Richard Uribe MD as PCP - General (Family Medicine)        History     Reviewed By Date/Time Sections Reviewed    Иван Doran CMA 5/14/2018 11:26 AM Tobacco            Objective:   Vital Signs:   Visit Vitals     /80     Pulse 80     Temp 98.1  F (36.7  C) (Oral)     Resp 16     Ht 5' 9\" (1.753 m)     Wt 165 lb (74.8 kg)  Comment: with shoes and ankle cast     Breastfeeding No     BMI 24.37 kg/m2          PHYSICAL EXAM  Gen: The patient appears well, in NAD.    HEENT: PERRL, EOMI. Oral mucosa is moist and pink, normal dentition.    Neck: supple. No adenopathy or thyromegaly.    Resp: Lungs are clear, good air entry, no wheezes, rhonchi or rales.    CV: S1 and S2 normal, no murmurs, regular rate and rhythm.    Abd: soft, nontender, nondistended, no appreciable organomegaly or masses  Ext: Left ankle is in a cast.    Neuro: no gross focal deficits, alert and oriented    Psych: affect is bright and appropriate           Medication List          These changes are accurate as of 5/14/18 11:59 PM.  If you have any questions, ask your nurse or doctor.               CONTINUE taking these medications          acetaminophen 500 MG tablet   Also known as:  TYLENOL   INSTRUCTIONS:  Take 500 mg by mouth every 6 (six) hours as needed for pain.           apixaban 2.5 mg Tab tablet   Also known as:  ELIQUIS   INSTRUCTIONS:  Take 2.5 mg by mouth 2 (two) times a day.                              Continue apixaban for the full 30 days.     Additional Screenings Completed Today:   None    Jolly Ridley MD    "

## 2021-06-20 NOTE — LETTER
Letter by Richard Uribe MD at      Author: Richard Uribe MD Service: -- Author Type: --    Filed:  Encounter Date: 9/15/2020 Status: (Other)         September 15, 2020     Patient: Ciera Pelayo   YOB: 1980   Date of Visit: 9/15/2020       To Whom It May Concern:     Ciera Pelayo took her 13-year old daughter for COVID 19 test today due to suspected symptoms.  Please excuse her from work today.    If you have any questions or concerns, please don't hesitate to call.    Sincerely,        Electronically signed by Richard Uribe MD

## 2021-06-25 NOTE — PROGRESS NOTES
FEMALE PREVENTATIVE EXAM    Assessment and Plan:     Patient has been advised of split billing requirements and indicates understanding: Yes    1. Routine general medical examination at a health care facility    - Basic Metabolic Panel  - Lipid Cascade FASTING     Next follow up:  No follow-ups on file.    Immunization Review  Adult Imm Review: No immunizations due today      I discussed the following with the patient:   Adult Healthy Living: Healthy nutrition  Patient states she will contact a female provider to have a Pap smear done in the near future within the next 3 months      Subjective:   Chief Complaint: Ciera Pelayo is an 41 y.o. female here for a preventative health visit.    Patient has been advised of split billing requirements and indicates understanding: Yes  HPI:    41-year-old female here for annual physical she is fasting she does not want a Pap smear today.    Healthy Habits  Are you taking a daily aspirin? No  Do you typically exercising at least 40 min, 3-4 times per week?  NO  Do you usually eat at least 4 servings of fruit and vegetables a day, include whole grains and fiber and avoid regularly eating high fat foods? Eats healthy  Have you had an eye exam in the past two years? Yes  Do you see a dentist twice per year? Yes  Do you have any concerns regarding sleep? No    Safety Screen  If you own firearms, are they secured in a locked gun cabinet or with trigger locks? Yes  Do you feel you are safe where you are living?: Yes (5/28/2021 12:52 PM)  Do you feel you are safe in your relationship(s)?: Yes (5/28/2021 12:52 PM)      Review of Systems:  Please see above.  The rest of the review of systems are negative for all systems.     Pap History:   No - age 30-65 PAP every 3 years recommended  Cancer Screening       Status Date      PAP SMEAR Overdue 4/26/2020      Done 4/26/2017      Patient has more history with this topic...          Patient Care Team:  Richard Uribe MD as PCP - General  "(Family Medicine)  Richard Uribe MD as Assigned PCP        History     Reviewed By Date/Time Sections Reviewed    Iraida Kendall MA 5/28/2021 12:55 PM Tobacco    Iraida Kendall MA 5/28/2021 12:52 PM Tobacco            Objective:   Vital Signs:   Visit Vitals  /74   Pulse 80   Temp 98.1  F (36.7  C) (Oral)   Resp 18   Ht 5' 8.7\" (1.745 m)   Wt 172 lb 7 oz (78.2 kg)   BMI 25.69 kg/m           PHYSICAL EXAM  /74   Pulse 80   Temp 98.1  F (36.7  C) (Oral)   Resp 18   Ht 5' 8.7\" (1.745 m)   Wt 172 lb 7 oz (78.2 kg)   BMI 25.69 kg/m      General Appearance:    Alert, cooperative, no distress, appears stated age   Head:    Normocephalic, without obvious abnormality, atraumatic   Eyes:    PERRL, conjunctiva/corneas clear, EOM's intact, fundi     benign, both eyes   Ears:    Normal TM's and external ear canals, both ears   Nose:   Nares normal, septum midline, mucosa normal, no drainage     or sinus tenderness   Throat:   Lips, mucosa, and tongue normal; teeth and gums normal   Neck:   Supple, symmetrical, trachea midline, no adenopathy;     thyroid:  no enlargement/tenderness/nodules; no carotid    bruit or JVD   Back:     Symmetric, no curvature, ROM normal, no CVA tenderness   Lungs:     Clear to auscultation bilaterally, respirations unlabored   Chest Wall:    No tenderness or deformity    Heart:    Regular rate and rhythm, S1 and S2 normal, no murmur, rub    or gallop   Breast Exam:       Abdomen:     Soft, non-tender, bowel sounds active all four quadrants,     no masses, no organomegaly   Genitalia:       Rectal:        Extremities:   Extremities normal, atraumatic, no cyanosis or edema   Pulses:   2+ and symmetric all extremities   Skin:   Skin color, texture, turgor normal, no rashes or lesions   Lymph nodes:   Cervical, supraclavicular, and axillary nodes normal   Neurologic:   CNII-XII intact, normal strength, sensation and reflexes     throughout        The 10-year ASCVD risk score (Juan Antonio DC "  et al., 2013) is: 0.8%    Values used to calculate the score:      Age: 41 years      Sex: Female      Is Non- : No      Diabetic: No      Tobacco smoker: No      Systolic Blood Pressure: 114 mmHg      Is BP treated: No      HDL Cholesterol: 38 mg/dL      Total Cholesterol: 182 mg/dL         Medication List      as of May 28, 2021  1:24 PM     You have not been prescribed any medications.         Additional Screenings Completed Today:

## 2021-07-06 VITALS
WEIGHT: 172.44 LBS | DIASTOLIC BLOOD PRESSURE: 74 MMHG | RESPIRATION RATE: 18 BRPM | HEIGHT: 69 IN | HEART RATE: 80 BPM | TEMPERATURE: 98.1 F | SYSTOLIC BLOOD PRESSURE: 114 MMHG | BODY MASS INDEX: 25.54 KG/M2

## 2021-10-16 ENCOUNTER — HEALTH MAINTENANCE LETTER (OUTPATIENT)
Age: 41
End: 2021-10-16

## 2021-10-26 ENCOUNTER — IMMUNIZATION (OUTPATIENT)
Dept: FAMILY MEDICINE | Facility: CLINIC | Age: 41
End: 2021-10-26
Payer: COMMERCIAL

## 2021-10-26 PROCEDURE — 90682 RIV4 VACC RECOMBINANT DNA IM: CPT

## 2021-10-26 PROCEDURE — 90471 IMMUNIZATION ADMIN: CPT

## 2022-07-23 ENCOUNTER — HEALTH MAINTENANCE LETTER (OUTPATIENT)
Age: 42
End: 2022-07-23

## 2022-10-01 ENCOUNTER — HEALTH MAINTENANCE LETTER (OUTPATIENT)
Age: 42
End: 2022-10-01

## 2024-03-03 ENCOUNTER — HEALTH MAINTENANCE LETTER (OUTPATIENT)
Age: 44
End: 2024-03-03